# Patient Record
Sex: MALE | Race: WHITE | NOT HISPANIC OR LATINO | URBAN - METROPOLITAN AREA
[De-identification: names, ages, dates, MRNs, and addresses within clinical notes are randomized per-mention and may not be internally consistent; named-entity substitution may affect disease eponyms.]

---

## 2018-12-26 ENCOUNTER — EMERGENCY (EMERGENCY)
Facility: HOSPITAL | Age: 43
LOS: 1 days | Discharge: HOME | End: 2018-12-26
Attending: EMERGENCY MEDICINE
Payer: COMMERCIAL

## 2018-12-26 VITALS
WEIGHT: 210.1 LBS | DIASTOLIC BLOOD PRESSURE: 79 MMHG | TEMPERATURE: 98 F | RESPIRATION RATE: 16 BRPM | OXYGEN SATURATION: 97 % | SYSTOLIC BLOOD PRESSURE: 123 MMHG | HEART RATE: 62 BPM

## 2018-12-26 PROCEDURE — 99283 EMERGENCY DEPT VISIT LOW MDM: CPT

## 2018-12-26 RX ORDER — LIDOCAINE 4 G/100G
1 CREAM TOPICAL ONCE
Qty: 0 | Refills: 0 | Status: COMPLETED | OUTPATIENT
Start: 2018-12-26 | End: 2018-12-26

## 2018-12-26 RX ORDER — IBUPROFEN 200 MG
600 TABLET ORAL ONCE
Qty: 0 | Refills: 0 | Status: COMPLETED | OUTPATIENT
Start: 2018-12-26 | End: 2018-12-26

## 2018-12-26 RX ORDER — DIAZEPAM 5 MG
1 TABLET ORAL
Qty: 9 | Refills: 0 | OUTPATIENT
Start: 2018-12-26 | End: 2018-12-28

## 2018-12-26 RX ORDER — DIAZEPAM 5 MG
5 TABLET ORAL ONCE
Qty: 0 | Refills: 0 | Status: DISCONTINUED | OUTPATIENT
Start: 2018-12-26 | End: 2018-12-26

## 2018-12-26 RX ADMIN — LIDOCAINE 1 PATCH: 4 CREAM TOPICAL at 13:50

## 2018-12-26 RX ADMIN — Medication 600 MILLIGRAM(S): at 13:50

## 2018-12-26 RX ADMIN — Medication 5 MILLIGRAM(S): at 13:51

## 2018-12-26 NOTE — ED PROVIDER NOTE - EXTREMITY EXAM
No obvious deformity. No tenderness of any joint in UE/LE. Full AROM with 5/5 strength UE/LE bilaterally. sensation intact. +SLR right at 30 degrees with reproduction of pain down posterior R buttock/knee

## 2018-12-26 NOTE — ED PROVIDER NOTE - MEDICAL DECISION MAKING DETAILS
Attending note-acute atraumatic right lower back pain with sciatica. NSAIDs, analgesia, reassess and referral to the spine center.

## 2018-12-26 NOTE — ED PROVIDER NOTE - NSFOLLOWUPINSTRUCTIONS_ED_ALL_ED_FT
1. Follow with your PMD within 1-2 days.  2. Rest, no heavy lifting.  Warm compresses to area. There are spine specialists within the [facility] system you may call [phone] for your back pain, call and arrange your follow up appointment to discuss possible MRI vs Physical Therapy as an outpatient. Recommend light walking. Perform gentle stretching exercised.   3. Take Motrin 600 mg every 8 hours for pain with food. Additionally you may apply one over-the-counter Lidoderm patch to the area of pain for up to 12 hours. Only apply 1 patch in one 24 hour period, remove old patch after 12 hours. For severe pain/spasm take Valium 5mg every 8 hours as needed - caution DO NOT DRIVE, DRINK ALCOHOL OR OPERATE MACHINERY WHILE TAKING THIS MEDICATION. CAN CAUSE DROWSINESS.  3. If you develop any worsening pain, weakness, numbness/tingling, bowel or urinary incontinence, inability to walk, fever/chills or any other concerning symptoms please return to the ED immediately.

## 2018-12-26 NOTE — ED PROVIDER NOTE - CARE PLAN
Principal Discharge DX:	Sciatica of right side  Assessment and plan of treatment:	1. Follow with your PMD within 1-2 days.  2. Rest, no heavy lifting.  Warm compresses to area. There are spine specialists within the [facility] system you may call [phone] for your back pain, call and arrange your follow up appointment to discuss possible MRI vs Physical Therapy as an outpatient. Recommend light walking. Perform gentle stretching exercised.   3. Take Motrin 600 mg every 8 hours for pain with food. Additionally you may apply one over-the-counter Lidoderm patch to the area of pain for up to 12 hours. Only apply 1 patch in one 24 hour period, remove old patch after 12 hours. For severe pain/spasm take Valium 5mg every 8 hours as needed - caution DO NOT DRIVE, DRINK ALCOHOL OR OPERATE MACHINERY WHILE TAKING THIS MEDICATION. CAN CAUSE DROWSINESS.  3. If you develop any worsening pain, weakness, numbness/tingling, bowel or urinary incontinence, inability to walk, fever/chills or any other concerning symptoms please return to the ED immediately.

## 2018-12-26 NOTE — ED PROVIDER NOTE - NS CPE EDP MUSC COCCYX LOC
Nontender No coccyx tenderness. +right sided buttock tenderness over piriformis muscle. No overlying erythema, swelling. No induration, fluctuance.

## 2018-12-26 NOTE — ED PROVIDER NOTE - PLAN OF CARE
1. Follow with your PMD within 1-2 days.  2. Rest, no heavy lifting.  Warm compresses to area. There are spine specialists within the [facility] system you may call [phone] for your back pain, call and arrange your follow up appointment to discuss possible MRI vs Physical Therapy as an outpatient. Recommend light walking. Perform gentle stretching exercised.   3. Take Motrin 600 mg every 8 hours for pain with food. Additionally you may apply one over-the-counter Lidoderm patch to the area of pain for up to 12 hours. Only apply 1 patch in one 24 hour period, remove old patch after 12 hours. For severe pain/spasm take Valium 5mg every 8 hours as needed - caution DO NOT DRIVE, DRINK ALCOHOL OR OPERATE MACHINERY WHILE TAKING THIS MEDICATION. CAN CAUSE DROWSINESS.  3. If you develop any worsening pain, weakness, numbness/tingling, bowel or urinary incontinence, inability to walk, fever/chills or any other concerning symptoms please return to the ED immediately.

## 2018-12-26 NOTE — ED PROVIDER NOTE - OBJECTIVE STATEMENT
44 yo otherwise healthy male presents to the ED c/o right buttock pain x 1 week. 44 yo otherwise healthy male presents to the ED c/o right buttock pain x 1 week. States pain started insidiously 1 week prior, cannot recall any inciting event or injury. Pain felt deep in right buttock, radiates down back of leg to knee at times, pain worsened with movement and walking. Took leftover "morphine" pill he had from previous surgery without relief. Has tried no other alleviating factor. Denies fever/chills, numbness/tingling, trauma, fall, midline back pain, night sweats, recent spinal procedure, dizziness, bowel/bladder incontinence, inability to ambulate, redness, swelling, bite/rash, abdominal pain, n/v/d, urinary urgency, frequency, dysuria, hematuria. 42 yo otherwise healthy male presents to the ED c/o right buttock pain x 1 week. States pain started insidiously 1 week prior, cannot recall any inciting event or injury. Pain felt deep in right buttock, radiates down back of leg to knee at times, pain worsened with movement and walking. Took leftover "morphine" pill he had from previous surgery without relief. Has tried no other alleviating factor. Denies fever/chills, numbness/tingling, trauma, fall, midline back pain, night sweats, recent spinal procedure, dizziness, bowel/bladder incontinence, inability to ambulate, redness, swelling, bite/rash, abdominal pain, n/v/d, urinary urgency, frequency, dysuria, hematuria.      Attending note. Patient was seen in fast track room #3. Patient complains of approximately 5 days atraumatic right lower back pain which radiates to the right lateral thigh. He denies any fever, chills, bowel or bladder dysfunction, numbness or paresthesia. Pain is exacerbated by movement and is deep aching pain and occasionally sharp. He denies any prior episodes of back pain.

## 2018-12-26 NOTE — ED ADULT TRIAGE NOTE - CHIEF COMPLAINT QUOTE
R upper buttock pain x 1 week. Pt denies any visible wounds, redness, swelling to R upper buttock.  No numbness/tingling/weakness to legs.  No falls, trauma, injuries. No fevers, chills.

## 2018-12-26 NOTE — ED PROVIDER NOTE - PROGRESS NOTE DETAILS
Pt feels improved s/p medication. Ambulating without difficulty. Appears well. Exam improved after meds. Discussed w/ attending, will discharge with medication, strict return precautions and ortho/spine/PT follow up. - Dawit Lundberg PA-C Pt feels improved s/p medication. Ambulating without difficulty. Appears well. Exam improved after meds. Discussed w/ attending, will discharge with medication, strict return precautions and ortho/spine/PT follow up. He is not driving himself home, has ride w/ relative. Stable for d/c.  - Dawit Lundberg PA-C

## 2018-12-26 NOTE — ED PROVIDER NOTE - PHYSICAL EXAMINATION
Attending note. The patient is alert and in moderate distress. Examination of back reveals no ecchymosis, rash or lesion. Patient has no CVA tenderness. Patient has tenderness in the right sciatic notch which reproduces symptoms. He has no midline or paralumbar tenderness. Straight leg raise is positive at 45° on the right. DTRs are +2/4 equal and symmetrical without clonus. Abdomen is soft and nontender. Sensation is intact. Motor strength is symmetrical.

## 2018-12-26 NOTE — ED ADULT NURSE NOTE - OBJECTIVE STATEMENT
44 yo presents to the ED from home. A&Ox4, ,ambulatory c/o R buttock pain x 1 week. pt reports that pain is mostly present in R upper buttock region and radiates down R leg. pt denies trauma. no obvious wounds. no redness, swelling to R upper buttock. No numbness/tingling/weakness to legs. No falls, trauma, injuries. No fevers, chills. pt well appearing. NAD noted. MD at bedside for eval.

## 2020-12-16 ENCOUNTER — EMERGENCY (EMERGENCY)
Facility: HOSPITAL | Age: 45
LOS: 1 days | Discharge: HOME | End: 2020-12-16
Attending: EMERGENCY MEDICINE | Admitting: EMERGENCY MEDICINE
Payer: COMMERCIAL

## 2020-12-16 VITALS
RESPIRATION RATE: 16 BRPM | OXYGEN SATURATION: 100 % | HEART RATE: 65 BPM | SYSTOLIC BLOOD PRESSURE: 141 MMHG | TEMPERATURE: 98 F | DIASTOLIC BLOOD PRESSURE: 76 MMHG

## 2020-12-16 VITALS
DIASTOLIC BLOOD PRESSURE: 87 MMHG | HEART RATE: 63 BPM | RESPIRATION RATE: 16 BRPM | TEMPERATURE: 98 F | OXYGEN SATURATION: 100 % | SYSTOLIC BLOOD PRESSURE: 137 MMHG

## 2020-12-16 PROCEDURE — 99283 EMERGENCY DEPT VISIT LOW MDM: CPT | Mod: 25

## 2020-12-16 PROCEDURE — 23650 CLTX SHO DSLC W/MNPJ WO ANES: CPT | Mod: LT,54

## 2020-12-16 PROCEDURE — 73030 X-RAY EXAM OF SHOULDER: CPT | Mod: 26,LT

## 2020-12-16 RX ORDER — IBUPROFEN 200 MG
600 TABLET ORAL ONCE
Refills: 0 | Status: COMPLETED | OUTPATIENT
Start: 2020-12-16 | End: 2020-12-16

## 2020-12-16 RX ADMIN — Medication 600 MILLIGRAM(S): at 11:48

## 2020-12-16 NOTE — ED PROVIDER NOTE - NEUROLOGICAL, MLM
Alert and oriented, no focal deficits, no motor or sensory deficits. dislocated left shoulder. nv intact distally

## 2020-12-16 NOTE — ED ADULT TRIAGE NOTE - CHIEF COMPLAINT QUOTE
Pt c/o pain to Lt shoulder after putting on jacket this morning. Pt moaning in pain. Pulses present bilaterally, Limited ROM.

## 2020-12-16 NOTE — ED ADULT NURSE NOTE - OBJECTIVE STATEMENT
Patient with a h/o a left arm shoulder dislocation in the past, c/o left  shoulder pain after twisting his arm to put on his coat today.

## 2020-12-16 NOTE — ED PROVIDER NOTE - OBJECTIVE STATEMENT
45 year old male with hx of multiple left shoulder dislocation presents with left shoulder pain. pain began while putting on his jacket. no other injuries. was in [facility] prior.

## 2020-12-16 NOTE — ED PROVIDER NOTE - PATIENT PORTAL LINK FT
You can access the FollowMyHealth Patient Portal offered by [facility] by registering at the following website: [email or web address]. By joining [employer]’s FollowMyHealth portal, you will also be able to view your health information using other applications (apps) compatible with our system.

## 2020-12-16 NOTE — ED ADULT NURSE REASSESSMENT NOTE - NS ED NURSE REASSESS COMMENT FT1
Patient awake and alert, presently appears comfortable after MD repositioned his left shoulder. Medicated as ordered, awaiting x ray, arm sling in place. Patient awake and alert, presently appears comfortable after MD Macias repositioned his left shoulder. Medicated as ordered, awaiting x ray, arm sling in place.

## 2021-12-23 NOTE — ED ADULT NURSE NOTE - MODE OF DISCHARGE
[facility]    PATIENT'S NAME: Jillian Pritchardjabier   ATTENDING PHYSICIAN: Adry Bolanso MD   CONSULTING PHYSICIAN: Abhijit Diaz.  Margot Roche MD   PATIENT ACCOUNT#:   [id number]    LOCATION:  [id number] [address] [facility] #:   [id number]       DATE OF JAMEL alcohol or caffeine. He does not exercise. Currently living with a friend. REVIEW OF SYSTEMS:  Ten-point review of systems otherwise negative. He has gained about 15 pounds over the last few weeks.       PHYSICAL EXAMINATION:    GENERAL:  Well-develop abnormalities or chest pain. Doubt that there has been an acute coronary syndrome. 3.   Ischemic cardiomyopathy with historically moderate LV dysfunction.   4.   Frequent falls with at least one episode of syncope, etiology of these episodes has not bee Ambulatory

## 2022-02-15 NOTE — ED PROVIDER NOTE - CPE EDP MUSC NORM
Discharge instructions given, questions answered. Verbalized understanding of all instructions. Discharged ambulatory, in stable condition.   - - -

## 2022-10-27 NOTE — ED PROVIDER NOTE - NS_EDPROVIDERDISPOUSERTYPE_ED_A_ED
Confirmed with pt he will bring disc with imaging for iris. 10/31.  
Attending Attestation (For Attendings USE Only)...

## 2022-12-01 NOTE — ED PROVIDER NOTE - CROS ED NEURO ALL NEG
Reason for visit: Post op follow up     Relevant information: Right renal mass; right robotic partial nephrectomy on 11/21    Records/imaging/labs/orders: Surgical pathology results ready    Pt called: N/A    At Rooming: Video    
negative...

## 2023-07-30 ENCOUNTER — EMERGENCY (EMERGENCY)
Facility: HOSPITAL | Age: 48
LOS: 1 days | Discharge: HOME | End: 2023-07-30
Attending: EMERGENCY MEDICINE | Admitting: EMERGENCY MEDICINE
Payer: COMMERCIAL

## 2023-07-30 VITALS
RESPIRATION RATE: 16 BRPM | SYSTOLIC BLOOD PRESSURE: 132 MMHG | DIASTOLIC BLOOD PRESSURE: 88 MMHG | OXYGEN SATURATION: 100 % | HEART RATE: 53 BPM | TEMPERATURE: 98 F

## 2023-07-30 LAB
ALBUMIN SERPL ELPH-MCNC: 4.2 G/DL — SIGNIFICANT CHANGE UP (ref 3.3–5)
ALP SERPL-CCNC: 52 U/L — SIGNIFICANT CHANGE UP (ref 40–120)
ALT FLD-CCNC: 23 U/L — SIGNIFICANT CHANGE UP (ref 4–41)
ANION GAP SERPL CALC-SCNC: 12 MMOL/L — SIGNIFICANT CHANGE UP (ref 7–14)
AST SERPL-CCNC: 19 U/L — SIGNIFICANT CHANGE UP (ref 4–40)
BASOPHILS # BLD AUTO: 0.06 K/UL — SIGNIFICANT CHANGE UP (ref 0–0.2)
BASOPHILS NFR BLD AUTO: 1 % — SIGNIFICANT CHANGE UP (ref 0–2)
BILIRUB SERPL-MCNC: 0.4 MG/DL — SIGNIFICANT CHANGE UP (ref 0.2–1.2)
BUN SERPL-MCNC: 14 MG/DL — SIGNIFICANT CHANGE UP (ref 7–23)
CALCIUM SERPL-MCNC: 9.8 MG/DL — SIGNIFICANT CHANGE UP (ref 8.4–10.5)
CHLORIDE SERPL-SCNC: 103 MMOL/L — SIGNIFICANT CHANGE UP (ref 98–107)
CO2 SERPL-SCNC: 24 MMOL/L — SIGNIFICANT CHANGE UP (ref 22–31)
CREAT SERPL-MCNC: 1 MG/DL — SIGNIFICANT CHANGE UP (ref 0.5–1.3)
EGFR: 92 ML/MIN/1.73M2 — SIGNIFICANT CHANGE UP
EOSINOPHIL # BLD AUTO: 0.14 K/UL — SIGNIFICANT CHANGE UP (ref 0–0.5)
EOSINOPHIL NFR BLD AUTO: 2.4 % — SIGNIFICANT CHANGE UP (ref 0–6)
GLUCOSE SERPL-MCNC: 113 MG/DL — HIGH (ref 70–99)
HCT VFR BLD CALC: 47.6 % — SIGNIFICANT CHANGE UP (ref 39–50)
HGB BLD-MCNC: 15.6 G/DL — SIGNIFICANT CHANGE UP (ref 13–17)
IANC: 3.5 K/UL — SIGNIFICANT CHANGE UP (ref 1.8–7.4)
IMM GRANULOCYTES NFR BLD AUTO: 0.3 % — SIGNIFICANT CHANGE UP (ref 0–0.9)
LYMPHOCYTES # BLD AUTO: 1.64 K/UL — SIGNIFICANT CHANGE UP (ref 1–3.3)
LYMPHOCYTES # BLD AUTO: 28.2 % — SIGNIFICANT CHANGE UP (ref 13–44)
MCHC RBC-ENTMCNC: 29.9 PG — SIGNIFICANT CHANGE UP (ref 27–34)
MCHC RBC-ENTMCNC: 32.8 GM/DL — SIGNIFICANT CHANGE UP (ref 32–36)
MCV RBC AUTO: 91.2 FL — SIGNIFICANT CHANGE UP (ref 80–100)
MONOCYTES # BLD AUTO: 0.46 K/UL — SIGNIFICANT CHANGE UP (ref 0–0.9)
MONOCYTES NFR BLD AUTO: 7.9 % — SIGNIFICANT CHANGE UP (ref 2–14)
NEUTROPHILS # BLD AUTO: 3.5 K/UL — SIGNIFICANT CHANGE UP (ref 1.8–7.4)
NEUTROPHILS NFR BLD AUTO: 60.2 % — SIGNIFICANT CHANGE UP (ref 43–77)
NRBC # BLD: 0 /100 WBCS — SIGNIFICANT CHANGE UP (ref 0–0)
NRBC # FLD: 0 K/UL — SIGNIFICANT CHANGE UP (ref 0–0)
PLATELET # BLD AUTO: 211 K/UL — SIGNIFICANT CHANGE UP (ref 150–400)
POTASSIUM SERPL-MCNC: 4.9 MMOL/L — SIGNIFICANT CHANGE UP (ref 3.5–5.3)
POTASSIUM SERPL-SCNC: 4.9 MMOL/L — SIGNIFICANT CHANGE UP (ref 3.5–5.3)
PROT SERPL-MCNC: 7.6 G/DL — SIGNIFICANT CHANGE UP (ref 6–8.3)
RBC # BLD: 5.22 M/UL — SIGNIFICANT CHANGE UP (ref 4.2–5.8)
RBC # FLD: 13.1 % — SIGNIFICANT CHANGE UP (ref 10.3–14.5)
SODIUM SERPL-SCNC: 139 MMOL/L — SIGNIFICANT CHANGE UP (ref 135–145)
TROPONIN T, HIGH SENSITIVITY RESULT: 7 NG/L — SIGNIFICANT CHANGE UP
WBC # BLD: 5.82 K/UL — SIGNIFICANT CHANGE UP (ref 3.8–10.5)
WBC # FLD AUTO: 5.82 K/UL — SIGNIFICANT CHANGE UP (ref 3.8–10.5)

## 2023-07-30 PROCEDURE — 99285 EMERGENCY DEPT VISIT HI MDM: CPT

## 2023-07-30 PROCEDURE — 71045 X-RAY EXAM CHEST 1 VIEW: CPT | Mod: 26

## 2023-07-30 PROCEDURE — 93010 ELECTROCARDIOGRAM REPORT: CPT

## 2023-07-30 RX ORDER — ACETAMINOPHEN 500 MG
975 TABLET ORAL ONCE
Refills: 0 | Status: COMPLETED | OUTPATIENT
Start: 2023-07-30 | End: 2023-07-30

## 2023-07-30 RX ORDER — LIDOCAINE 4 G/100G
1 CREAM TOPICAL ONCE
Refills: 0 | Status: COMPLETED | OUTPATIENT
Start: 2023-07-30 | End: 2023-07-30

## 2023-07-30 RX ADMIN — LIDOCAINE 1 PATCH: 4 CREAM TOPICAL at 13:09

## 2023-07-30 RX ADMIN — Medication 975 MILLIGRAM(S): at 13:06

## 2023-07-30 NOTE — ED PROVIDER NOTE - CLINICAL SUMMARY MEDICAL DECISION MAKING FREE TEXT BOX
48-year-old male past medical history of hyperlipidemia presenting with chest pain.  Endorsing chest pain for more than 2 weeks.  States initially started off mild but has gotten worse.  States he is unsure if it is because he sleeps directly on his stomach.  Denies any other associated symptoms such as palpitations, diaphoresis, difficulty breathing, shortness of breath, abdominal pain, nausea vomiting diarrhea, urinary complaints, lower extremity swelling.    Vital signs stable bradycardia, afebrile, not hypoxic. Nonfocal PE. Plan for basic labs, trop, EKG, CXR  Differential diagnosis includes but not limited to ACS vs. angina vs. infectious etiology vs. metabolic derangement

## 2023-07-30 NOTE — ED ADULT NURSE NOTE - OBJECTIVE STATEMENT
Patient received to intake room 10C A&OX4, ambulatory, accompanied by wife coming to the ED for complaints of midsternal/left sided non radiating chest pain x 1 week. Patient denies SOB, dizziness, palpitations, headache, N/V/D, abdominal pain at this time. RR equal and unlabored. Bradycardia noted in triage. Endorsing smoking and occasional marijuana use. 20G IV placed in the R AC labs drawn and sent. Medicated as ordered. Care plan continued. Comfort measures provided. Safety maintained. Awaiting lab results and imaging.

## 2023-07-30 NOTE — ED PROVIDER NOTE - NSFOLLOWUPINSTRUCTIONS_ED_ALL_ED_FT
Chest Pain    Chest pain can be caused by many different conditions which may or may not be dangerous. Causes include heartburn, lung infections, heart attack, blood clot in lungs, skin infections, strain or damage to muscle, cartilage, or bones, etc. In addition to a history and physical examination, an electrocardiogram (ECG) or other lab tests may have been performed to determine the cause of your chest pain. Follow up with your primary care provider or with a cardiologist as instructed.     SEEK IMMEDIATE MEDICAL CARE IF YOU HAVE ANY OF THE FOLLOWING SYMPTOMS: worsening chest pain, coughing up blood, unexplained back/neck/jaw pain, severe abdominal pain, dizziness or lightheadedness, fainting, shortness of breath, sweaty or clammy skin, vomiting, or racing heart beat. These symptoms may represent a serious problem that is an emergency. Do not wait to see if the symptoms will go away. Get medical help right away. Call 911 and do not drive yourself to the hospital.    Please follow up with your primary medical doctor within two days.  Return to the emergency department if you feel that your condition is worsening    You can take Tylenol or Advil for pain. Please follow the instructions on the bottles.    To control your pain at home, you should take Ibuprofen 400 mg along with Tylenol 650mg-1000mg every 6 to 8 hours. Limit your maximum daily Tylenol from all sources to 4000mg. Be aware that many other medications contain acetaminophen which is also known as Tylenol. Taking Tylenol and Ibuprofen together has been shown to be more effective at relieving pain than taking them separately. These are both over the counter medications that you can  at your local pharmacy without a prescription. You need to respect all of the warnings on the bottles. You shouldn’t take these medications for more than a week without following up with your doctor. Both medications come with certain risks and side effects that you need to discuss with your doctor, especially if you are taking them for a prolonged period.

## 2023-07-30 NOTE — ED PROVIDER NOTE - ATTENDING CONTRIBUTION TO CARE
The patient is a 48y Male smoker who has a past medical and surgery history of HLD PTED with chest pain as described    Vital Signs Last 24 Hrs  T(C): 36.8 T(F): 98.3 HR: 53 BP: 132/88 RR: 16 SpO2: 100% (30 Jul 2023 12:11)   PE: as described; my additions and exceptions are noted in the chart    DATA:  EKG: NSB otherwise normal  LAB:                       15.6   5.82  )-----------( 211      ( 30 Jul 2023 13:07 )             47.6   07-30    139  |  103  |  14  ----------------------------<  113<H>  4.9   |  24  |  1.00    Ca    9.8      30 Jul 2023 13:07    TPro  7.6  /  Alb  4.2  /  TBili  0.4  /  DBili  x   /  AST  19  /  ALT  23  /  AlkPhos  52  07-30     Troponin T, High Sensitivity Result: 7 ng/L (07-30-23 @ 13:07)  Urinalysis Basic - ( 30 Jul 2023 13:07 )    Color: x / Appearance: x / SG: x / pH: x  Gluc: 113 mg/dL / Ketone: x  / Bili: x / Urobili: x   Blood: x / Protein: x / Nitrite: x   Leuk Esterase: x / RBC: x / WBC x   Sq Epi: x / Non Sq Epi: x / Bacteria: x       CXR IMPRESSION:  Clear lungs.    IMPRESSION/RISK:  Dx= MSK   Consideration include: night time occurences smoking other s/s bring up possibility of atypical presentations of GERD   Plan  tylenol for pain   keep cardiology/pcp followup   consider H2 monitor s/s for coincidence with food or for s/s persisting thru tylenol  RTED PRN

## 2023-07-30 NOTE — ED PROVIDER NOTE - CARE PROVIDER_API CALL
Jorje Alcantara.  Family Medicine  [address], [address] [place] [address]  Phone: [phone]  Fax: [phone]  Established Patient  Follow Up Time: 4-6 Days

## 2023-07-30 NOTE — ED PROVIDER NOTE - PATIENT PORTAL LINK FT
You can access the FollowMyHealth Patient Portal offered by [facility] by registering at the following website: [email or web address]. By joining [employer]’s FollowMyHealth portal, you will also be able to view your health information using other applications (apps) compatible with our system.

## 2023-07-30 NOTE — ED PROVIDER NOTE - PROGRESS NOTE DETAILS
Resident: Henry Sykes, PGY2 – Pt was re-evaluated at bedside, VSS, feeling better overall. Results were discussed with patient as well as return precautions and follow up plan with PCP and/or specialist. Time was taken to answer any questions that the patient had before providing them with discharge paperwork. Discussed f/u with cardiology and continuing with Tylenol as needed fo pain

## 2023-07-30 NOTE — ED PROVIDER NOTE - PHYSICAL EXAMINATION
Henry Sykes DO (PGY2)   Physical Exam:    Gen: NAD, AOx3  Head: NCAT  HEENT: EOMI, PEERLA  Lung: CTAB, no respiratory distress, no wheezes/rhonchi/rales B/L  CV: RRR, no murmurs, rubs or gallops  Abd: soft, NT, ND, no guarding, no rigidity, no rebound tenderness, no CVA tenderness   MSK: no visible deformities, ROM normal in UE/LE, no back pain  Neuro: No focal sensory or motor deficits  Skin: Warm, well perfused, no rash, no leg swelling

## 2023-07-30 NOTE — ED PROVIDER NOTE - OBJECTIVE STATEMENT
48-year-old male past medical history of hyperlipidemia presenting with chest pain.  Endorsing chest pain for more than 2 weeks.  States initially started off mild but has gotten worse.  States he is unsure if it is because he sleeps directly on his stomach.  Denies any other associated symptoms such as palpitations, diaphoresis, difficulty breathing, shortness of breath, abdominal pain, nausea vomiting diarrhea, urinary complaints, lower extremity swelling.  States he is a daily smoker and social drinker.  Denies any history of cardiac surgeries, DVT or PE, recent travel.  Denies taking medications for his symptoms.

## 2023-07-30 NOTE — ED ADULT TRIAGE NOTE - CHIEF COMPLAINT QUOTE
Presents to ED c/o worsening chest pain, states it started a few weeks ago but it was mild. Denies any other associating symptoms. Denies medical history.

## 2024-10-04 PROBLEM — Z00.00 ENCOUNTER FOR PREVENTIVE HEALTH EXAMINATION: Status: ACTIVE | Noted: 2024-10-04

## 2024-10-07 ENCOUNTER — APPOINTMENT (OUTPATIENT)
Dept: GASTROENTEROLOGY | Facility: CLINIC | Age: 49
End: 2024-10-07